# Patient Record
Sex: FEMALE | Race: WHITE
[De-identification: names, ages, dates, MRNs, and addresses within clinical notes are randomized per-mention and may not be internally consistent; named-entity substitution may affect disease eponyms.]

---

## 2017-02-27 ENCOUNTER — HOSPITAL ENCOUNTER (OUTPATIENT)
Dept: HOSPITAL 53 - M LABDRAW1 | Age: 52
End: 2017-02-27
Attending: INTERNAL MEDICINE
Payer: COMMERCIAL

## 2017-02-27 DIAGNOSIS — M05.79: Primary | ICD-10-CM

## 2017-02-27 DIAGNOSIS — E55.9: ICD-10-CM

## 2017-02-27 DIAGNOSIS — M10.09: ICD-10-CM

## 2017-02-27 DIAGNOSIS — Z79.899: ICD-10-CM

## 2017-02-27 LAB — URATE SERPL-MCNC: 4.1 MG/DL (ref 2.6–6)

## 2017-03-02 LAB
B BURGDOR IGG+IGM SER-ACNC: <0.91 ISR (ref 0–0.9)
B BURGDOR IGM SER IA-ACNC: <0.8 INDEX (ref 0–0.79)

## 2017-03-17 ENCOUNTER — HOSPITAL ENCOUNTER (OUTPATIENT)
Dept: HOSPITAL 53 - M SMT | Age: 52
End: 2017-03-17
Attending: INTERNAL MEDICINE
Payer: COMMERCIAL

## 2017-03-17 DIAGNOSIS — M05.772: ICD-10-CM

## 2017-03-17 DIAGNOSIS — Z79.899: ICD-10-CM

## 2017-03-17 DIAGNOSIS — R31.1: Primary | ICD-10-CM

## 2017-03-17 LAB
ALBUMIN SERPL BCG-MCNC: 3.5 GM/DL (ref 3.2–5.2)
ALBUMIN/GLOB SERPL: 1 {RATIO} (ref 1–1.93)
ALP SERPL-CCNC: 126 U/L (ref 45–117)
ALT SERPL W P-5'-P-CCNC: 39 U/L (ref 12–78)
ANION GAP SERPL CALC-SCNC: 8 MEQ/L (ref 8–16)
AST SERPL-CCNC: 37 U/L (ref 15–37)
BACTERIA URNS QL MICRO: (no result)
BASOPHILS # BLD AUTO: 0 K/MM3 (ref 0–0.2)
BASOPHILS NFR BLD AUTO: 0.7 % (ref 0–1)
BILIRUB SERPL-MCNC: 0.3 MG/DL (ref 0.2–1)
BUN SERPL-MCNC: 11 MG/DL (ref 7–18)
CALCIUM SERPL-MCNC: 8.3 MG/DL (ref 8.5–10.1)
CHLORIDE SERPL-SCNC: 105 MEQ/L (ref 98–107)
CO2 SERPL-SCNC: 29 MEQ/L (ref 21–32)
CREAT SERPL-MCNC: 0.87 MG/DL (ref 0.55–1.02)
EOSINOPHIL # BLD AUTO: 0.1 K/MM3 (ref 0–0.5)
EOSINOPHIL NFR BLD AUTO: 2.5 % (ref 0–3)
ERYTHROCYTE [DISTWIDTH] IN BLOOD BY AUTOMATED COUNT: 11.9 % (ref 11.5–14.5)
GFR SERPL CREATININE-BSD FRML MDRD: > 60 ML/MIN/{1.73_M2} (ref 51–?)
GLUCOSE SERPL-MCNC: 121 MG/DL (ref 70–105)
HYALINE CASTS URNS QL MICRO: (no result) /LPF (ref 0–1)
LARGE UNSTAINED CELL #: 0.1 K/MM3 (ref 0–0.4)
LARGE UNSTAINED CELL %: 1.6 % (ref 0–4)
LYMPHOCYTES # BLD AUTO: 2.2 K/MM3 (ref 1.5–4.5)
LYMPHOCYTES NFR BLD AUTO: 39 % (ref 24–44)
MCH RBC QN AUTO: 31.2 PG (ref 27–33)
MCHC RBC AUTO-ENTMCNC: 34.2 G/DL (ref 32–36.5)
MCV RBC AUTO: 91 FL (ref 80–96)
MICROSCOPIC EXAM: (no result)
MICROSCOPIC INDICATED? MAN: YES
MONOCYTES # BLD AUTO: 0.2 K/MM3 (ref 0–0.8)
MONOCYTES NFR BLD AUTO: 4 % (ref 0–5)
NEUTROPHILS # BLD AUTO: 2.9 K/MM3 (ref 1.8–7.7)
NEUTROPHILS NFR BLD AUTO: 52.1 % (ref 36–66)
PLATELET # BLD AUTO: 287 K/MM3 (ref 150–450)
POTASSIUM SERPL-SCNC: 4.7 MEQ/L (ref 3.5–5.1)
PROT SERPL-MCNC: 7 GM/DL (ref 6.4–8.2)
RBC #/AREA URNS HPF: (no result) /HPF (ref 0–3)
SODIUM SERPL-SCNC: 142 MEQ/L (ref 136–145)
SQUAMOUS URNS QL MICRO: (no result) /HPF
WBC # BLD AUTO: 5.6 K/MM3 (ref 4–10)
WBC #/AREA URNS HPF: (no result) /HPF (ref 0–3)

## 2017-07-07 ENCOUNTER — HOSPITAL ENCOUNTER (OUTPATIENT)
Dept: HOSPITAL 53 - M LAB REF | Age: 52
End: 2017-07-07
Attending: NURSE PRACTITIONER
Payer: COMMERCIAL

## 2017-07-07 DIAGNOSIS — E78.00: Primary | ICD-10-CM

## 2017-07-08 LAB — COMMENT LDL DIRECT: (no result)

## 2017-07-11 ENCOUNTER — HOSPITAL ENCOUNTER (OUTPATIENT)
Dept: HOSPITAL 53 - M LAB | Age: 52
End: 2017-07-11
Attending: PODIATRIST
Payer: COMMERCIAL

## 2017-07-11 DIAGNOSIS — Z01.818: Primary | ICD-10-CM

## 2017-07-11 LAB
ALBUMIN SERPL BCG-MCNC: 3.8 GM/DL (ref 3.2–5.2)
ALBUMIN/GLOB SERPL: 1.15 {RATIO} (ref 1–1.93)
ALP SERPL-CCNC: 153 U/L (ref 45–117)
ALT SERPL W P-5'-P-CCNC: 44 U/L (ref 12–78)
ANION GAP SERPL CALC-SCNC: 10 MEQ/L (ref 8–16)
AST SERPL-CCNC: 31 U/L (ref 15–37)
BILIRUB SERPL-MCNC: 1.1 MG/DL (ref 0.2–1)
BUN SERPL-MCNC: 16 MG/DL (ref 7–18)
CALCIUM SERPL-MCNC: 9.1 MG/DL (ref 8.5–10.1)
CHLORIDE SERPL-SCNC: 102 MEQ/L (ref 98–107)
CO2 SERPL-SCNC: 25 MEQ/L (ref 21–32)
CREAT SERPL-MCNC: 0.97 MG/DL (ref 0.55–1.02)
ERYTHROCYTE [DISTWIDTH] IN BLOOD BY AUTOMATED COUNT: 12.7 % (ref 11.5–14.5)
GFR SERPL CREATININE-BSD FRML MDRD: > 60 ML/MIN/{1.73_M2} (ref 51–?)
GLUCOSE SERPL-MCNC: 111 MG/DL (ref 70–105)
MCH RBC QN AUTO: 33.1 PG (ref 27–33)
MCHC RBC AUTO-ENTMCNC: 34.9 G/DL (ref 32–36.5)
MCV RBC AUTO: 94.7 FL (ref 80–96)
PLATELET # BLD AUTO: 243 K/MM3 (ref 150–450)
POTASSIUM SERPL-SCNC: 3.5 MEQ/L (ref 3.5–5.1)
PROT SERPL-MCNC: 7.1 GM/DL (ref 6.4–8.2)
SODIUM SERPL-SCNC: 137 MEQ/L (ref 136–145)
WBC # BLD AUTO: 7.9 K/MM3 (ref 4–10)

## 2017-07-19 ENCOUNTER — HOSPITAL ENCOUNTER (OUTPATIENT)
Dept: HOSPITAL 53 - M SDC | Age: 52
Discharge: HOME | End: 2017-07-19
Attending: PODIATRIST
Payer: COMMERCIAL

## 2017-07-19 VITALS — SYSTOLIC BLOOD PRESSURE: 123 MMHG | DIASTOLIC BLOOD PRESSURE: 77 MMHG

## 2017-07-19 VITALS — HEIGHT: 68.5 IN | WEIGHT: 188 LBS | BODY MASS INDEX: 28.17 KG/M2

## 2017-07-19 DIAGNOSIS — F41.9: ICD-10-CM

## 2017-07-19 DIAGNOSIS — M20.42: ICD-10-CM

## 2017-07-19 DIAGNOSIS — Z79.899: ICD-10-CM

## 2017-07-19 DIAGNOSIS — E78.5: ICD-10-CM

## 2017-07-19 DIAGNOSIS — M20.12: Primary | ICD-10-CM

## 2017-07-19 DIAGNOSIS — M21.612: ICD-10-CM

## 2017-07-19 DIAGNOSIS — Q66.89: ICD-10-CM

## 2017-07-19 DIAGNOSIS — F32.9: ICD-10-CM

## 2017-07-19 PROCEDURE — 28285 REPAIR OF HAMMERTOE: CPT

## 2017-07-19 PROCEDURE — 97116 GAIT TRAINING THERAPY: CPT

## 2017-07-19 PROCEDURE — 28110 PART REMOVAL OF METATARSAL: CPT

## 2017-07-19 PROCEDURE — 28308 INCISION OF METATARSAL: CPT

## 2017-07-19 PROCEDURE — 28296 COR HLX VLGS DSTL MTAR OSTEO: CPT

## 2017-07-19 PROCEDURE — 73630 X-RAY EXAM OF FOOT: CPT

## 2017-07-19 PROCEDURE — 88300 SURGICAL PATH GROSS: CPT

## 2017-07-19 NOTE — REP
Left foot three views postoperative study:

 

Comparisons 02/27/2017.

 

There are multiple osteotomies in the necks of the great toe metatarsal, second

digit metatarsal and fifth digit metatarsal with orthopedic screw fixation at

each osteotomy site.  The skeletal structures and soft tissues are satisfactory

positions alignment.

 

There is surgical fusion of the second digit PIP with the skeletal structures and

fixation screw are in satisfactory positions alignment.

 

 

Signed by

Tim Chen MD 07/19/2017 10:45 A

## 2017-07-19 NOTE — RO
DATE OF PROCEDURE:  07/19/2017

 

PREOPERATIVE DIAGNOSES:

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

Tailor's bunion deformity of the left foot.

Long second metatarsal left foot.

Hammertoe deformity second toe left foot.

Ruptured plantar plate secondary metatarsal left foot.

 

POSTOPERATIVE DIAGNOSES:

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

Tailor's bunion deformity of the left foot.

Long second metatarsal, left foot.

Hammertoe deformity second toe, left foot.

Ruptured plantar plate secondary metatarsal, left foot.

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

 

PROCEDURE PERFORMED:

1.  Irineo bunionectomy with internal screw fixation, a 3.0 mm x 22 mm times one,

left foot.

2.  Proximal interphalangeal joint arthroplasty with fusion with a 2.0 x 10

degree angled DigiFuse.

3.  2nd metatarsal osteotomy with internal screw fixation.

 

SURGEON:  Aleks Rebolledo DPM

 

FIRST ASSISTANT:  None.

 

ANESTHESIA:  Local monitored anesthesia care (MAC).

 

IRRIGATIONS:  Dilute bacitracin, neomycin and polymyxin B solution.

 

HEMOSTASIS:  Ankle pneumatic tourniquet at 200 mmHg for 104 minutes.

 

HARDWARE UTILIZED:  Arthrex snap off screw 2 x 13 mm and a 2 x 11 mm and a right

medical DART-FIRE 3.0 x 22 and a 2.5 x 16 cannulated screw, and a 2.0 x 10 degree

angled DigiFuse.

 

DESCRIPTION OF OPERATION:  On 07/19/2017 this 52-year-old white female was taken

from her hospital room to the operating room and placed on the operating room

table in supine position.  Following the induction of IV sedation, local and

regional anesthesia, the left lower extremity was prepped and draped in the usual

aseptic manner.  The left lower extremity was elevated 45 degrees from the

horizontal plane for the purpose of preoperative exsanguination of the limb.

During this 3 minute time period, an ankle pneumatic tourniquet was applied just

proximal to the medial and lateral malleolus, well padded site.  To further

exsanguinate the limb, a Jeremy's esmarch bandage was placed circumferentially

extending from the digits to the distal edge of the ankle pneumatic tourniquet.

The ankle pneumatic tourniquet was rapidly inflated to 200 mmHg for the purpose

of intraoperative hemostasis.  Jeremy's esmarch bandage was removed.  The left

lower extremity was returned to the operating room table, sterile drape was

placed and the following procedure was performed:

 

IRINEO BUNIONECTOMY WITH INTERNAL SCREW FIXATION, A 3.0 mm x 22 mm TIMES ONE,

LEFT FOOT:  Attention was directed to the patient's left foot where there was

noted to be a hallux valgus deformity.  At this time, a 6 cm incision was placed

over the first metatarsal phalangeal joint.  The incision was deepened through

subcutaneous tissues and all coursing venous tributaries were identified,

underscored, clamped, cut, ligated, and electrocoagulated as necessary.  A linear

capsulotomy was then performed in the same plane as the original skin incision.

The capsular and periosteal structures were then reflected dorsally, medially and

laterally.  The hypertrophied medial eminence of the first metatarsal, which was

osteotomized from distal to proximal, through and through, exiting medially to

the sesamoidal groove.  Medially along the capsule there was some calcification

in the capsule.  This was then removed.

 

Attention was directed into the first intermetatarsal space where dissection was

carried down to the level of the conjoined tendon was sharply dissected free from

the fibular sesamoid.  Attention was directed to the medial aspect of the first

metatarsal shaft where a V shaped osteotomy was performed on the distal

metaphysis with a long plantar and short dorsal wing.  Upon creation of this

osteotomy, a capital fragment was transposed 40% of the width of the shaft of the

first metatarsal and fixated with a 3.0 x 22 mm DART-FIRE compression screw.  It

did not penetrate the inferior cartilage on direct visualization.  The redundant

cortical spike was osteotomized from dorsal to plantar through and through and

extirpated from the wound. The medial surface was rasped to a smooth contour.

Slight spurring was noted on the dorsal surface of the 1st metatarsal which was

rongeured smooth and ten filed with a handheld rasp.  The wound was flushed with

copious amounts of dilute Bacitracin, Neomycin, and Polymyxin B solution.

Attention was directed towards closure where the capsular structures were coapted

and maintained utilizing #2-0 Monocryl in a simple interrupted type fashion. The

subcutaneous tissues were coapted and maintained utilizing #4-0 Monocryl in a

simple interrupted type fashion. The skin incision was coapted and maintained

using #5-0 Monocryl in a continuous subcuticular type fashion.  Attention was

then directed to the 2nd toe where the following procedure was performed.

 

PROXIMAL INTERPHALANGEAL JOINT ARTHROPLASTY WITH FUSION WITH A 2.0 X 10 DEGREE

ANGLED DIGIFUSE:  Attention was directed to the patient's 2nd toe where an

incision was made from the proximal interphalangeal joint to proximal to the

metatarsal phalangeal joint.  The incision was deepened through subcutaneous

tissue and all coursing venous tributaries were identified, underscored, clamped,

cut, ligated, and electrocoagulated as necessary.  A transverse tenotomy and

capsulotomy was then performed to the level of the proximal interphalangeal joint

and the medial and lateral collateral ligaments were sharply dissected free from

the head of the proximal phalanx.  To minimize shortening, an osteotomy was

performed just proximal to the articular cartilage of the proximal phalanx from

dorsal to plantar, through and through.  Cartilage was similarly removed from the

base of the middle phalanx.  This was then fixated with a 2.0 x 10 degree angled

DigiFuse across the second proximal interphalangeal joint, which was noted to be

stable.  The following procedure was then performed.

 

2ND METATARSAL OSTEOTOMY WITH INTERNAL SCREW FIXATION:  Attention was directed to

the second metatarsal phalangeal joint where a transverse tenotomy and

capsulotomy was performed over the capsule of the 2nd metatarsal phalangeal joint

where considerable joint fluid was noted.  Considerable articular erosion was

noted on the head of the 2nd metatarsal measuring approximately 6 mm x 8 mm;

however, fortunately, there was cartilage present on the proximal phalanx.  There

was noted to be a tear over the plantar plate on inspection of the joint surfaces

of the inferior capsule.  Utilizing a saggital saw, a Weil osteotomy was

performed paralleling the plantar surface of the foot starting at the head of the

2nd metatarsal, however the cartilage in that area was eroded, and this was

shortened approximately 5 mm, temporarily fixated with a 1.6 mm Arnaldo wire.

The plantar plate was inspected and the transverse tear was noted just distal to

the proximal phalanx.  This was then freed and the plantar surface of the base of

the proximal phalanx was roughened with a coarse file.  The wound was flushed

with copious amounts of dilute bacitracin, neomycin and polymyxin B solution.

The plantar plate was then repaired with 0 FiberWire utilizing a mini scorpion

through the plantar plate.  Two drill tunnels were then placed through the

proximal phalanx in a parallel fashion from dorsal to planter, exiting just

proximal to the articular cartilage.  Utilizing a mini tendon passer, the strands

through the plantar plate were then pulled into the dorsal aspect of the proximal

phalanx.  The K-wire was then removed and the osteotomy was stabilized and two

snap off screws were placed, a 2.13 distally and a 2.11 proximally.  The toe was

then held in a plantar position and the suture strands were tightened,

re-delivering the plantar plate to the proximal phalanx.  Utilizing a 0.9 wire,

the 2nd metatarsal head was fenestrated/microfractured to promote

fibrocartilaginous ingrowth.  The dorsal surface of the metatarsal was then

smoothed and filed with a handheld rasp.  The wound was flushed with copious

amounts of dilute bacitracin, neomycin and polymyxin B solution.  Attention was

directed towards closure where the capsular structures were coapted and

maintained utilizing #2-0 Monocryl in a simple interrupted type fashion. The

medial and lateral strands of the capsule for the collateral ligaments were then

reinforced with #3-0 Monocryl in a simple interrupted type fashion.  The skin

extensor tendon was coapted and maintained with abraded #4-0 nylon loop suture

with a four stranded core repair in a modified Vora fashion with peripheral

stitch of #4-0 abraded nylon.  The subcutaneous tissue were coapted and

maintained using #4-0 Monocryl in a simple interrupted type fashion.  Skin

incisions were coapted and maintained using #4-0 Prolene in a simple interrupted

and horizontal mattress type fashion.  Attention was then directed to the lateral

surface of the foot where a Tailor's bunion deformity was noted.  Then the

following procedure was performed.

 

TAILOR'S BUNIONECTOMY WITH DISTAL V OSTEOTOMY AND INTERNAL SCREW FIXATION 2.5 x

16 mm TIMES ONE:  Attention was directed to the lateral surface of the foot where

a 4 cm lateral incision was placed over the metatarsal phalangeal joint.  The

incision was deepened through subcutaneous tissues and all coursing venous

tributaries were identified, underscored, clamped, cut, ligated, and

electrocoagulated as necessary.  A linear capsulotomy was then performed superior

to the abductor digiti minimi tendon.  The capsule was then elevated dorsally and

plantarly, creating a capsular periosteal type envelope.  Utilizing a saggital

saw, an osteotomy was performed through the lateral eminence of the 5th

metatarsal from dorsal to plantar, through and through.  A V shaped osteotomy was

then performed in the distal metaphysis of the 5th metatarsal with long plantar

and short dorsal wing.  It was transposed approximately 30-40% of the width of

the shaft of the 5th metatarsal and fixated with a 2.5 x 16 mm cannulated

compression screw.  The osteotomy was noted to be stable in all three cardinal

planes.  The redundant cortical spike was osteotomized from dorsal to plantar

through and through and extirpated from the wound in toto. The lateral surface

was rasped to a smooth contour with a handheld rasp.  The wound was flushed with

copious amounts of dilute Bacitracin, Neomycin, and Polymyxin B solution.

Capsular structures were coapted and maintained using #2-0 Monocryl in a simple

interrupted type fashion.  The subcutaneous tissues were coapted and maintained

utilizing #4-0 Monocryl in a simple interrupted type fashion. The skin incision

was coapted and maintained utilizing #5-0 Monocryl in a continuous subcuticular

type fashion.  This was additionally reinforced with Steri-Strips.

 

Approximately 1 hour and 4 minutes into the procedure, the tourniquet was

released and the procedure was completed without ankle pneumatic control of

bleeding.  A sterile compressive dressing was then applied after 4 mg of

dexamethasone sodium phosphate was instilled proximal to the surgical site.  This

was constructed with Adaptic, 4x4's, 4x4 splints, Janet and Coban.

 

The patient apparently having tolerated the surgical procedure well, was taken

from the OR to the recovery room with vital signs stable, patient afebrile, and

for further monitoring by the anesthesia department.

 

All surgical specimens removed during the operative procedure were sent to

pathology for examination.  Postoperative instructions given upon discharge.

## 2018-06-04 ENCOUNTER — HOSPITAL ENCOUNTER (OUTPATIENT)
Dept: HOSPITAL 53 - M SFHCLERA | Age: 53
End: 2018-06-04
Attending: DERMATOLOGY
Payer: COMMERCIAL

## 2018-06-04 DIAGNOSIS — L82.1: Primary | ICD-10-CM

## 2018-06-04 PROCEDURE — 88305 TISSUE EXAM BY PATHOLOGIST: CPT

## 2018-06-04 PROCEDURE — 88313 SPECIAL STAINS GROUP 2: CPT

## 2018-08-23 ENCOUNTER — HOSPITAL ENCOUNTER (OUTPATIENT)
Dept: HOSPITAL 53 - M WHC | Age: 53
End: 2018-08-23
Attending: NURSE PRACTITIONER
Payer: COMMERCIAL

## 2018-08-23 ENCOUNTER — HOSPITAL ENCOUNTER (OUTPATIENT)
Dept: HOSPITAL 53 - M SFHCWAGY | Age: 53
End: 2018-08-23
Attending: NURSE PRACTITIONER
Payer: COMMERCIAL

## 2018-08-23 DIAGNOSIS — Z12.4: Primary | ICD-10-CM

## 2018-08-23 DIAGNOSIS — Z12.31: Primary | ICD-10-CM

## 2018-08-23 PROCEDURE — 77067 SCR MAMMO BI INCL CAD: CPT

## 2018-08-24 LAB — HPV LOW VOL RFLX: (no result)

## 2018-11-08 ENCOUNTER — HOSPITAL ENCOUNTER (OUTPATIENT)
Dept: HOSPITAL 53 - M SFHCLERA | Age: 53
End: 2018-11-08
Attending: DERMATOLOGY
Payer: COMMERCIAL

## 2018-11-08 DIAGNOSIS — C44.509: Primary | ICD-10-CM

## 2018-11-08 PROCEDURE — 88305 TISSUE EXAM BY PATHOLOGIST: CPT

## 2018-11-29 ENCOUNTER — HOSPITAL ENCOUNTER (OUTPATIENT)
Dept: HOSPITAL 53 - M LAB REF | Age: 53
End: 2018-11-29
Attending: INTERNAL MEDICINE
Payer: COMMERCIAL

## 2018-11-29 DIAGNOSIS — M10.9: Primary | ICD-10-CM

## 2018-11-29 LAB — URIC ACID: 6.7 MG/DL (ref 2.6–6)

## 2018-11-29 PROCEDURE — 84550 ASSAY OF BLOOD/URIC ACID: CPT

## 2019-07-10 ENCOUNTER — HOSPITAL ENCOUNTER (OUTPATIENT)
Dept: HOSPITAL 53 - M SFHCPLAZ | Age: 54
End: 2019-07-10
Attending: DERMATOLOGY
Payer: COMMERCIAL

## 2019-07-10 DIAGNOSIS — L57.0: Primary | ICD-10-CM

## 2019-08-23 ENCOUNTER — HOSPITAL ENCOUNTER (OUTPATIENT)
Dept: HOSPITAL 53 - M SFHCWAGY | Age: 54
End: 2019-08-23
Attending: NURSE PRACTITIONER
Payer: COMMERCIAL

## 2019-08-23 ENCOUNTER — HOSPITAL ENCOUNTER (OUTPATIENT)
Dept: HOSPITAL 53 - M WHC | Age: 54
End: 2019-08-23
Attending: NURSE PRACTITIONER
Payer: COMMERCIAL

## 2019-08-23 DIAGNOSIS — Z12.4: Primary | ICD-10-CM

## 2019-08-23 DIAGNOSIS — R92.2: Primary | ICD-10-CM

## 2019-08-23 LAB
CHLAMYDIA DNA AMPLIFICATION: NEGATIVE
N GONORRHOEA RRNA SPEC QL NAA+PROBE: NEGATIVE

## 2019-08-23 PROCEDURE — 87624 HPV HI-RISK TYP POOLED RSLT: CPT

## 2019-08-23 PROCEDURE — 87591 N.GONORRHOEAE DNA AMP PROB: CPT

## 2019-08-23 PROCEDURE — 87389 HIV-1 AG W/HIV-1&-2 AB AG IA: CPT

## 2019-08-23 PROCEDURE — 36415 COLL VENOUS BLD VENIPUNCTURE: CPT

## 2019-08-23 PROCEDURE — 87491 CHLMYD TRACH DNA AMP PROBE: CPT

## 2019-08-23 NOTE — REP
BILATERAL MAMMOGRAM WITH 3D TOMOSYNTHESIS:

 

Bilateral mammography performed in the MLO and CC projections with 3D

tomosynthesis.

 

Comparison made with multiple prior exams, most recently 08/23/2018.

 

There is a family history of breast cancer at age 92 in maternal grandmother and

at age 45 in maternal cousin. [facility] lifetime risk of breast cancer at

14.9%.

 

Mild scattered fibroglandular tissue is seen bilaterally. There is a rounded

nodular density at 6-o'clock position of the right breast measuring approximately

5-6 mm in diameter. Scattered benign-appearing calcifications are seen.

 

IMPRESSION:

 

BIRADS 0: BI-RADS/ACR category 0 mammogram, Incomplete: Need additional imaging

evaluation and/or prior mammograms for comparison.

 

ACR 0 incomplete. Nodular density 6-o'clock position right breast approximately

5-6 mm in diameter. Recommend spot compression views and ultrasound to further

evaluate. ACR 0 incomplete.

 

This mammogram was interpreted with the aid of an FDA-approved computer-aided

detection system.

 

The patient states she/he had a clinical breast exam in 08/2019.

 

The patient letter being requested is M0.

## 2019-08-26 LAB — HPV LOW VOL RFLX: (no result)

## 2019-08-27 ENCOUNTER — HOSPITAL ENCOUNTER (OUTPATIENT)
Dept: HOSPITAL 53 - M RAD | Age: 54
End: 2019-08-27
Attending: NURSE PRACTITIONER
Payer: COMMERCIAL

## 2019-08-27 DIAGNOSIS — R92.2: Primary | ICD-10-CM

## 2019-08-27 DIAGNOSIS — N63.10: ICD-10-CM

## 2019-08-27 NOTE — REP
DIAGNOSTIC MAMMOGRAM RIGHT BREAST WITH RIGHT BREAST ULTRASOUND:

 

Multiple spot compression views right breast performed and correlated with recent

mammogram of 08/23/2019 and compared to other prior studies.

 

In the inferior right breast at 6-o'clock to 7 -o'clock position, there is a

nodule which appears somewhat ill-defined on spot compression views.  It measures

5 x 6 mm.  The measurements in 2018 were approximately 4 x 5 mm.

 

Real-time sonographic evaluation of the inferior right breast demonstrates no

definite sonographic correlate.

 

IMPRESSION:

 

BIRADS 4:  BI-RADS/ACR category 4 mammogram.  Suspicious Abnormality - biopsy

should be considered.

 

ACR 4 suspicious.  Ill-defined nodule 5 x 6 mm at 6-o'clock to 7-o'clock position

right breast.  There is no definite sonographic correlate.  Recommend

stereotactic biopsy.

 

Patient letter requested is M4.

 

 

Electronically Signed by

Tim Rose MD 08/28/2019 12:07 A

## 2019-10-07 ENCOUNTER — HOSPITAL ENCOUNTER (OUTPATIENT)
Dept: HOSPITAL 53 - M SFHCWAGY | Age: 54
End: 2019-10-07
Attending: NURSE PRACTITIONER
Payer: COMMERCIAL

## 2019-10-07 DIAGNOSIS — R87.810: Primary | ICD-10-CM

## 2020-01-21 ENCOUNTER — HOSPITAL ENCOUNTER (OUTPATIENT)
Dept: HOSPITAL 53 - M LAB REF | Age: 55
End: 2020-01-21
Attending: DERMATOLOGY
Payer: COMMERCIAL

## 2020-01-21 DIAGNOSIS — C44.529: Primary | ICD-10-CM

## 2020-02-12 ENCOUNTER — HOSPITAL ENCOUNTER (OUTPATIENT)
Dept: HOSPITAL 53 - M LAB REF | Age: 55
End: 2020-02-12
Attending: OPHTHALMOLOGY
Payer: COMMERCIAL

## 2020-02-12 DIAGNOSIS — D23.122: Primary | ICD-10-CM

## 2020-03-03 ENCOUNTER — HOSPITAL ENCOUNTER (OUTPATIENT)
Dept: HOSPITAL 53 - M LAB REF | Age: 55
End: 2020-03-03
Attending: DERMATOLOGY
Payer: COMMERCIAL

## 2020-03-03 DIAGNOSIS — L90.5: Primary | ICD-10-CM

## 2020-04-22 ENCOUNTER — HOSPITAL ENCOUNTER (OUTPATIENT)
Dept: HOSPITAL 53 - M LABSMTC | Age: 55
End: 2020-04-22
Attending: FAMILY MEDICINE
Payer: COMMERCIAL

## 2020-04-22 DIAGNOSIS — Z11.59: Primary | ICD-10-CM

## 2020-04-22 DIAGNOSIS — Z20.828: ICD-10-CM

## 2020-08-25 ENCOUNTER — HOSPITAL ENCOUNTER (OUTPATIENT)
Dept: HOSPITAL 53 - M LAB REF | Age: 55
End: 2020-08-25
Attending: NURSE PRACTITIONER
Payer: COMMERCIAL

## 2020-08-25 DIAGNOSIS — Z12.4: Primary | ICD-10-CM

## 2020-11-03 ENCOUNTER — HOSPITAL ENCOUNTER (OUTPATIENT)
Dept: HOSPITAL 53 - M LAB REF | Age: 55
End: 2020-11-03
Attending: DERMATOLOGY
Payer: COMMERCIAL

## 2020-11-03 DIAGNOSIS — L72.11: Primary | ICD-10-CM

## 2021-01-27 ENCOUNTER — HOSPITAL ENCOUNTER (OUTPATIENT)
Dept: HOSPITAL 53 - M LABSMTC | Age: 56
End: 2021-01-27
Attending: PEDIATRICS
Payer: SELF-PAY

## 2021-01-27 DIAGNOSIS — Z20.822: Primary | ICD-10-CM

## 2021-03-22 ENCOUNTER — HOSPITAL ENCOUNTER (EMERGENCY)
Dept: HOSPITAL 53 - M ED | Age: 56
Discharge: HOME | End: 2021-03-22
Payer: COMMERCIAL

## 2021-03-22 VITALS — BODY MASS INDEX: 35.45 KG/M2 | WEIGHT: 233.91 LBS | HEIGHT: 68 IN

## 2021-03-22 VITALS — DIASTOLIC BLOOD PRESSURE: 75 MMHG | SYSTOLIC BLOOD PRESSURE: 153 MMHG

## 2021-03-22 DIAGNOSIS — J45.909: ICD-10-CM

## 2021-03-22 DIAGNOSIS — E11.9: ICD-10-CM

## 2021-03-22 DIAGNOSIS — K21.9: ICD-10-CM

## 2021-03-22 DIAGNOSIS — Z79.899: ICD-10-CM

## 2021-03-22 DIAGNOSIS — I10: ICD-10-CM

## 2021-03-22 DIAGNOSIS — K57.30: ICD-10-CM

## 2021-03-22 DIAGNOSIS — F32.9: ICD-10-CM

## 2021-03-22 DIAGNOSIS — K57.32: Primary | ICD-10-CM

## 2021-03-22 DIAGNOSIS — E78.5: ICD-10-CM

## 2021-03-22 DIAGNOSIS — F41.9: ICD-10-CM

## 2021-03-22 LAB
ALBUMIN SERPL BCG-MCNC: 3.7 GM/DL (ref 3.2–5.2)
ALT SERPL W P-5'-P-CCNC: 58 U/L (ref 12–78)
BASOPHILS # BLD AUTO: 0.1 10^3/UL (ref 0–0.2)
BASOPHILS NFR BLD AUTO: 0.4 % (ref 0–1)
BILIRUB CONJ SERPL-MCNC: 0.2 MG/DL (ref 0–0.2)
BILIRUB SERPL-MCNC: 0.6 MG/DL (ref 0.2–1)
EOSINOPHIL # BLD AUTO: 0.1 10^3/UL (ref 0–0.5)
EOSINOPHIL NFR BLD AUTO: 0.6 % (ref 0–3)
HCT VFR BLD AUTO: 43.3 % (ref 36–47)
HGB BLD-MCNC: 14.9 G/DL (ref 12–15.5)
LIPASE SERPL-CCNC: 54 U/L (ref 73–393)
LYMPHOCYTES # BLD AUTO: 2.2 10^3/UL (ref 1.5–5)
LYMPHOCYTES NFR BLD AUTO: 15.5 % (ref 24–44)
MCH RBC QN AUTO: 31 PG (ref 27–33)
MCHC RBC AUTO-ENTMCNC: 34.4 G/DL (ref 32–36.5)
MCV RBC AUTO: 90.2 FL (ref 80–96)
MONOCYTES # BLD AUTO: 1 10^3/UL (ref 0–0.8)
MONOCYTES NFR BLD AUTO: 7 % (ref 2–8)
NEUTROPHILS # BLD AUTO: 11 10^3/UL (ref 1.5–8.5)
NEUTROPHILS NFR BLD AUTO: 76 % (ref 36–66)
PLATELET # BLD AUTO: 240 10^3/UL (ref 150–450)
PROT SERPL-MCNC: 7.5 GM/DL (ref 6.4–8.2)
RBC # BLD AUTO: 4.8 10^6/UL (ref 4–5.4)
WBC # BLD AUTO: 14.5 10^3/UL (ref 4–10)

## 2021-03-22 PROCEDURE — 80076 HEPATIC FUNCTION PANEL: CPT

## 2021-03-22 PROCEDURE — 99284 EMERGENCY DEPT VISIT MOD MDM: CPT

## 2021-03-22 PROCEDURE — 96361 HYDRATE IV INFUSION ADD-ON: CPT

## 2021-03-22 PROCEDURE — 85025 COMPLETE CBC W/AUTO DIFF WBC: CPT

## 2021-03-22 PROCEDURE — 74177 CT ABD & PELVIS W/CONTRAST: CPT

## 2021-03-22 PROCEDURE — 80047 BASIC METABLC PNL IONIZED CA: CPT

## 2021-03-22 PROCEDURE — 36415 COLL VENOUS BLD VENIPUNCTURE: CPT

## 2021-03-22 PROCEDURE — 83605 ASSAY OF LACTIC ACID: CPT

## 2021-03-22 PROCEDURE — 96360 HYDRATION IV INFUSION INIT: CPT

## 2021-03-22 PROCEDURE — 83690 ASSAY OF LIPASE: CPT

## 2021-03-22 NOTE — REP
INDICATION:

LLQ pain, hx diverticulitis feels the same



COMPARISON:

None.



TECHNIQUE:

CT Scan of the abdomen and pelvis was performed with intravenous administration of 100

cc of Isovue 370, without oral contrast.  Sagittal and coronal reconstruction images

are performed.



FINDINGS:

Lung bases: Unremarkable.

Liver:  There is diffuse fatty infiltration of the liver.

Gallbladder:  Unremarkable.

Spleen:  Normal.

Adrenals:  Normal.

Pancreas:  Normal.

Kidneys:  Normal.

Small and large bowel: There is colonic diverticulosis.  There is segmental thickening

of the sigmoid colon with streaky pericolonic inflammation compatible with

diverticulitis.

Free fluid:  None.

Abdominal aorta: No aneurysm or dissection.

Adenopathy:  None.

Appendix: Not inflamed.

Osseous structures:  There are degenerative changes of the spine without compression

deformity.

Pelvis:  No mass.



IMPRESSION:

Sigmoid diverticulitis.  No free air, free fluid or abscess.





<Electronically signed by Tim Rose > 03/22/21 1001

## 2021-04-19 ENCOUNTER — HOSPITAL ENCOUNTER (OUTPATIENT)
Dept: HOSPITAL 53 - M LAB REF | Age: 56
End: 2021-04-19
Attending: DERMATOLOGY
Payer: COMMERCIAL

## 2021-04-19 DIAGNOSIS — L90.5: Primary | ICD-10-CM

## 2021-04-20 ENCOUNTER — HOSPITAL ENCOUNTER (OUTPATIENT)
Dept: HOSPITAL 53 - M LAB REF | Age: 56
End: 2021-04-20
Attending: INTERNAL MEDICINE
Payer: COMMERCIAL

## 2021-04-20 DIAGNOSIS — R10.9: Primary | ICD-10-CM

## 2021-06-20 ENCOUNTER — HOSPITAL ENCOUNTER (OUTPATIENT)
Dept: HOSPITAL 53 - M LABSMTC | Age: 56
End: 2021-06-20
Attending: ANESTHESIOLOGY
Payer: COMMERCIAL

## 2021-06-20 DIAGNOSIS — Z01.812: Primary | ICD-10-CM

## 2021-06-20 DIAGNOSIS — Z20.828: ICD-10-CM

## 2021-06-25 ENCOUNTER — HOSPITAL ENCOUNTER (OUTPATIENT)
Dept: HOSPITAL 53 - M OPP | Age: 56
Discharge: HOME | End: 2021-06-25
Attending: INTERNAL MEDICINE
Payer: COMMERCIAL

## 2021-06-25 VITALS — WEIGHT: 231 LBS | BODY MASS INDEX: 34.61 KG/M2 | HEIGHT: 68.5 IN

## 2021-06-25 VITALS — SYSTOLIC BLOOD PRESSURE: 161 MMHG | DIASTOLIC BLOOD PRESSURE: 88 MMHG

## 2021-06-25 DIAGNOSIS — K57.20: ICD-10-CM

## 2021-06-25 DIAGNOSIS — Z80.52: ICD-10-CM

## 2021-06-25 DIAGNOSIS — R19.7: ICD-10-CM

## 2021-06-25 DIAGNOSIS — Z80.49: ICD-10-CM

## 2021-06-25 DIAGNOSIS — Z79.84: ICD-10-CM

## 2021-06-25 DIAGNOSIS — D12.3: Primary | ICD-10-CM

## 2021-06-25 DIAGNOSIS — R53.83: ICD-10-CM

## 2021-06-25 DIAGNOSIS — Z79.899: ICD-10-CM

## 2021-06-25 DIAGNOSIS — Z86.010: ICD-10-CM

## 2021-06-25 NOTE — ROOR
________________________________________________________________________________

Patient Name: Sole Calderón          Procedure Date: 6/25/2021 7:30 AM

MRN: [id number]                          Account Number: [id number]

YOB: 1965               Age: 56

Room: [facility]                            Gender: Female

Note Status: Finalized                 

________________________________________________________________________________

 

Procedure:            Total Colonoscopy to Cecum + Cold Snare Polypectomy + 

                      Hemoclips

Indications:          Diverticulitis, Follow-up of diverticulitis

Providers:            Saurav Walker MD

Referring MD:         Elizabeth WALKER MD

Requesting Provider:  

Medicines:            Monitored Anesthesia Care

Complications:        No immediate complications.

________________________________________________________________________________

Procedure:            Pre-Anesthesia Assessment:

                      - The heart rate, respiratory rate, oxygen saturations, 

                      blood pressure, adequacy of pulmonary ventilation, and 

                      response to care were monitored throughout the procedure.

                      The Colonoscope was introduced through the anus and 

                      advanced to the cecum, identified by appendiceal orifice 

                      and ileocecal valve. The colonoscopy was performed 

                      without difficulty. The patient tolerated the procedure 

                      well. The quality of the bowel preparation was excellent.

                                                                                

Findings:

     The perianal and digital rectal examinations were normal.

     Internal hemorrhoids were found during retroflexion. The hemorrhoids were 

     small and Grade I (internal hemorrhoids that do not prolapse).

     Scattered small-mouthed diverticula were found in the colon.

     A small polyp was found in the transverse colon. The polyp was sessile. 

     The polyp was removed with a cold snare. Resection and retrieval were 

     complete. To prevent bleeding after the polypectomy, two hemostatic clips 

     were successfully placed (MR conditional). There was no bleeding at the 

     end of the procedure.

     The exam was otherwise without abnormality on direct and retroflexion 

     views.

                                                                                

Impression:           - Internal hemorrhoids.

                      - Diverticulosis.

                      - One small polyp in the transverse colon, removed with 

                      a cold snare. Resected and retrieved. Clips (MR 

                      conditional) were placed.

                      - The examination was otherwise normal on direct and 

                      retroflexion views.

                      - The exam was otherwise normal to the cecum.

Recommendation:       - Patient has a contact number available for 

                      emergencies. The signs and symptoms of potential delayed 

                      complications were discussed with the patient. Return to 

                      normal activities tomorrow. Written discharge 

                      instructions were provided to the patient.

                      - High fiber diet.

                      - Discharge patient to home.

                      - Continue present medications.

                      - Await pathology results.

                      - Telephone GI clinic for pathology results in 1 week.

                      - Repeat colonoscopy in 5 years for surveillance based 

                      on pathology results.

                      - Return to referring physician.

                      - The findings and recommendations were discussed with 

                      the patient's family.

                                                                                

Procedure Code(s):    --- Professional ---

                      85648, Colonoscopy, flexible; with removal of tumor(s), 

                      polyp(s), or other lesion(s) by snare technique

Diagnosis Code(s):    --- Professional ---

                      K64.0, First degree hemorrhoids

                      K63.5, Polyp of colon

                      K57.32, Diverticulitis of large intestine without 

                      perforation or abscess without bleeding

                      K57.30, Diverticulosis of large intestine without 

                      perforation or abscess without bleeding

 

CPT copyright 2019 [employer]. All rights reserved.

 

The codes documented in this report are preliminary and upon  review may 

be revised to meet current compliance requirements.

 

Saurav Walker MD

____________________

Saurav Walker MD

6/25/2021 7:59:24 AM

Electronically signed by Saurav Walker MD

Number of Addenda: 0

 

Note Initiated On: 6/25/2021 7:30 AM

Estimated Blood Loss: Estimated blood loss: none.

## 2021-09-20 ENCOUNTER — HOSPITAL ENCOUNTER (OUTPATIENT)
Dept: HOSPITAL 53 - M LAB REF | Age: 56
End: 2021-09-20
Attending: PHYSICIAN ASSISTANT
Payer: COMMERCIAL

## 2021-09-20 DIAGNOSIS — L57.0: Primary | ICD-10-CM

## 2021-09-28 ENCOUNTER — HOSPITAL ENCOUNTER (OUTPATIENT)
Dept: HOSPITAL 53 - M SFHCWAGY | Age: 56
End: 2021-09-28
Attending: NURSE PRACTITIONER
Payer: COMMERCIAL

## 2021-09-28 DIAGNOSIS — Z12.4: Primary | ICD-10-CM

## 2021-09-28 DIAGNOSIS — Z01.419: ICD-10-CM

## 2021-10-20 ENCOUNTER — HOSPITAL ENCOUNTER (OUTPATIENT)
Dept: HOSPITAL 53 - M LAB REF | Age: 56
End: 2021-10-20
Attending: PHYSICIAN ASSISTANT
Payer: COMMERCIAL

## 2021-10-20 DIAGNOSIS — C44.519: Primary | ICD-10-CM

## 2021-12-06 ENCOUNTER — HOSPITAL ENCOUNTER (OUTPATIENT)
Dept: HOSPITAL 53 - M SFHCWAGY | Age: 56
End: 2021-12-06
Attending: NURSE PRACTITIONER
Payer: COMMERCIAL

## 2021-12-06 DIAGNOSIS — R39.15: Primary | ICD-10-CM

## 2021-12-09 ENCOUNTER — HOSPITAL ENCOUNTER (OUTPATIENT)
Dept: HOSPITAL 53 - M WHC | Age: 56
End: 2021-12-09
Attending: NURSE PRACTITIONER
Payer: COMMERCIAL

## 2021-12-09 DIAGNOSIS — N85.4: ICD-10-CM

## 2021-12-09 DIAGNOSIS — N93.9: Primary | ICD-10-CM

## 2022-09-16 ENCOUNTER — HOSPITAL ENCOUNTER (OUTPATIENT)
Dept: HOSPITAL 53 - M LAB REF | Age: 57
End: 2022-09-16
Attending: INTERNAL MEDICINE
Payer: COMMERCIAL

## 2022-09-16 DIAGNOSIS — E78.00: Primary | ICD-10-CM

## 2022-09-17 LAB — LDLC SERPL DIRECT ASSAY-MCNC: 81 MG/DL (ref 0–99)

## 2022-10-05 ENCOUNTER — HOSPITAL ENCOUNTER (OUTPATIENT)
Dept: HOSPITAL 53 - M WHC | Age: 57
End: 2022-10-05
Attending: NURSE PRACTITIONER
Payer: COMMERCIAL

## 2022-10-05 DIAGNOSIS — N95.2: ICD-10-CM

## 2022-10-05 DIAGNOSIS — Z12.4: Primary | ICD-10-CM

## 2022-10-05 PROCEDURE — 87624 HPV HI-RISK TYP POOLED RSLT: CPT

## 2023-03-28 ENCOUNTER — HOSPITAL ENCOUNTER (OUTPATIENT)
Dept: HOSPITAL 53 - M LAB REF | Age: 58
End: 2023-03-28
Attending: INTERNAL MEDICINE
Payer: COMMERCIAL

## 2023-03-28 DIAGNOSIS — M10.9: Primary | ICD-10-CM

## 2023-06-19 ENCOUNTER — HOSPITAL ENCOUNTER (OUTPATIENT)
Dept: HOSPITAL 53 - M SFHCDERM | Age: 58
End: 2023-06-19
Attending: PHYSICIAN ASSISTANT
Payer: COMMERCIAL

## 2023-06-19 DIAGNOSIS — D49.2: Primary | ICD-10-CM

## 2023-10-16 ENCOUNTER — HOSPITAL ENCOUNTER (OUTPATIENT)
Dept: HOSPITAL 53 - M SFHCWAGY | Age: 58
End: 2023-10-16
Attending: NURSE PRACTITIONER
Payer: COMMERCIAL

## 2023-10-16 DIAGNOSIS — Z12.4: Primary | ICD-10-CM

## 2023-10-16 PROCEDURE — 87624 HPV HI-RISK TYP POOLED RSLT: CPT

## 2024-01-29 ENCOUNTER — HOSPITAL ENCOUNTER (OUTPATIENT)
Dept: HOSPITAL 53 - M SLEEP HO | Age: 59
End: 2024-01-29
Attending: NURSE PRACTITIONER
Payer: COMMERCIAL

## 2024-01-29 DIAGNOSIS — R40.0: Primary | ICD-10-CM

## 2024-03-12 ENCOUNTER — HOSPITAL ENCOUNTER (OUTPATIENT)
Dept: HOSPITAL 53 - M LAB REF | Age: 59
End: 2024-03-12
Attending: INTERNAL MEDICINE
Payer: COMMERCIAL

## 2024-03-12 DIAGNOSIS — M10.9: Primary | ICD-10-CM

## 2024-08-30 ENCOUNTER — HOSPITAL ENCOUNTER (OUTPATIENT)
Dept: HOSPITAL 53 - M WUC | Age: 59
End: 2024-08-30
Attending: PHYSICIAN ASSISTANT
Payer: COMMERCIAL

## 2024-08-30 DIAGNOSIS — M79.674: Primary | ICD-10-CM

## 2024-08-30 DIAGNOSIS — M19.071: ICD-10-CM

## 2024-08-30 DIAGNOSIS — Z87.81: ICD-10-CM

## 2024-10-17 ENCOUNTER — HOSPITAL ENCOUNTER (OUTPATIENT)
Dept: HOSPITAL 53 - M SFHCWAGY | Age: 59
End: 2024-10-17
Attending: NURSE PRACTITIONER
Payer: COMMERCIAL

## 2024-10-17 DIAGNOSIS — R87.810: ICD-10-CM

## 2024-10-17 DIAGNOSIS — N95.2: ICD-10-CM

## 2024-10-17 DIAGNOSIS — Z12.4: Primary | ICD-10-CM

## 2024-10-17 PROCEDURE — 87624 HPV HI-RISK TYP POOLED RSLT: CPT

## 2024-12-20 ENCOUNTER — HOSPITAL ENCOUNTER (OUTPATIENT)
Dept: HOSPITAL 53 - M SOG | Age: 59
End: 2024-12-20
Attending: PHYSICIAN ASSISTANT
Payer: COMMERCIAL

## 2024-12-20 DIAGNOSIS — M77.32: Primary | ICD-10-CM

## 2025-01-06 ENCOUNTER — HOSPITAL ENCOUNTER (OUTPATIENT)
Dept: HOSPITAL 53 - M PLAIMG | Age: 60
End: 2025-01-06
Attending: PHYSICIAN ASSISTANT
Payer: COMMERCIAL
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
## 2025-01-06 DIAGNOSIS — S86.012A: ICD-10-CM
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
## 2025-01-06 DIAGNOSIS — M79.672: Primary | ICD-10-CM
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
## 2025-01-06 DIAGNOSIS — M19.072: ICD-10-CM
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
## 2025-04-29 ENCOUNTER — HOSPITAL ENCOUNTER (OUTPATIENT)
Dept: HOSPITAL 53 - M LAB REF | Age: 60
End: 2025-04-29
Attending: INTERNAL MEDICINE
Payer: COMMERCIAL

## 2025-04-29 DIAGNOSIS — M10.9: Primary | ICD-10-CM
